# Patient Record
Sex: MALE | Race: WHITE | Employment: OTHER | URBAN - METROPOLITAN AREA
[De-identification: names, ages, dates, MRNs, and addresses within clinical notes are randomized per-mention and may not be internally consistent; named-entity substitution may affect disease eponyms.]

---

## 2022-06-04 ENCOUNTER — TELEPHONE ENCOUNTER (OUTPATIENT)
Dept: URBAN - METROPOLITAN AREA CLINIC 68 | Facility: CLINIC | Age: 77
End: 2022-06-04

## 2022-06-04 RX ORDER — POLYETHYLENE GLYCOL 3350, SODIUM SULFATE, SODIUM CHLORIDE, POTASSIUM CHLORIDE, ASCORBIC ACID, SODIUM ASCORBATE 7.5-2.691G
KIT ORAL AS DIRECTED
Qty: 1 | Refills: 0 | OUTPATIENT
Start: 2012-01-10 | End: 2017-02-02

## 2022-06-04 RX ORDER — LOSARTAN POTASSIUM AND HYDROCHLOROTHIAZIDE 100; 25 MG/1; MG/1
LOSARTAN POTASSIUM-HCTZ( 100-25MG ORAL  DAILY ) INACTIVE -HX ENTRY TABLET, FILM COATED ORAL DAILY
OUTPATIENT
Start: 2017-02-02

## 2022-06-04 RX ORDER — DOXAZOSIN 2 MG/1
DOXAZOSIN MESYLATE( 2MG ORAL  3 MG PER DAY  ) INACTIVE -HX ENTRY TABLET ORAL
OUTPATIENT
Start: 2017-02-02

## 2022-06-05 ENCOUNTER — TELEPHONE ENCOUNTER (OUTPATIENT)
Dept: URBAN - METROPOLITAN AREA CLINIC 68 | Facility: CLINIC | Age: 77
End: 2022-06-05

## 2022-06-05 RX ORDER — TESTOSTERONE CYPIONATE 100 MG/ML
TESTOSTERONE CYPIONATE( 100MG/ML INTRAMUSCULAR  EVERY 2 WEEKS ) ACTIVE -HX ENTRY INJECTION, SOLUTION INTRAMUSCULAR EVERY 2 WEEKS
Status: ACTIVE | COMMUNITY
Start: 2017-02-02

## 2022-06-05 RX ORDER — ASPIRIN 81 MG/1
ASPIRIN EC( 81MG ORAL  150 MG PER DAY  ) ACTIVE -HX ENTRY TABLET ORAL
Status: ACTIVE | COMMUNITY
Start: 2017-02-02

## 2022-06-05 RX ORDER — SIMVASTATIN 40 MG/1
SIMVASTATIN( 40MG ORAL  DAILY ) ACTIVE -HX ENTRY TABLET, FILM COATED ORAL DAILY
Status: ACTIVE | COMMUNITY
Start: 2017-02-02

## 2022-06-05 RX ORDER — LOSARTAN POTASSIUM 100 MG/1
LOSARTAN POTASSIUM( 100MG ORAL  DAILY ) ACTIVE -HX ENTRY TABLET ORAL DAILY
Status: ACTIVE | COMMUNITY
Start: 2017-02-02

## 2022-06-05 RX ORDER — ANASTROZOLE 1 MG/1
ANASTROZOLE( 1MG ORAL 1 A WEEK ) ACTIVE -HX ENTRY TABLET ORAL
Status: ACTIVE | COMMUNITY
Start: 2017-02-02

## 2022-06-25 ENCOUNTER — TELEPHONE ENCOUNTER (OUTPATIENT)
Age: 77
End: 2022-06-25

## 2022-06-25 RX ORDER — POLYETHYLENE GLYCOL 3350, SODIUM SULFATE, SODIUM CHLORIDE, POTASSIUM CHLORIDE, ASCORBIC ACID, SODIUM ASCORBATE 7.5-2.691G
KIT ORAL AS DIRECTED
Qty: 1 | Refills: 0 | OUTPATIENT
Start: 2012-01-10 | End: 2017-02-02

## 2022-06-25 RX ORDER — DOXAZOSIN 2 MG/1
DOXAZOSIN MESYLATE( 2MG ORAL  3 MG PER DAY  ) INACTIVE -HX ENTRY TABLET ORAL
OUTPATIENT
Start: 2017-02-02

## 2022-06-25 RX ORDER — LOSARTAN POTASSIUM AND HYDROCHLOROTHIAZIDE 25; 100 MG/1; MG/1
LOSARTAN POTASSIUM-HCTZ( 100-25MG ORAL  DAILY ) INACTIVE -HX ENTRY TABLET, FILM COATED ORAL DAILY
OUTPATIENT
Start: 2017-02-02

## 2022-06-26 ENCOUNTER — TELEPHONE ENCOUNTER (OUTPATIENT)
Age: 77
End: 2022-06-26

## 2022-06-26 RX ORDER — METFORMIN HYDROCHLORIDE 1000 MG/1
METFORMIN HCL( 500MG ORAL  TWO TIMES DAILY ) ACTIVE -HX ENTRY TABLET, FILM COATED, EXTENDED RELEASE ORAL
Status: ACTIVE | COMMUNITY
Start: 2017-02-02

## 2022-06-26 RX ORDER — ASPIRIN 81 MG
ASPIRIN EC( 81MG ORAL  150 MG PER DAY  ) ACTIVE -HX ENTRY TABLET, DELAYED RELEASE (ENTERIC COATED) ORAL
Status: ACTIVE | COMMUNITY
Start: 2017-02-02

## 2022-06-26 RX ORDER — LOSARTAN POTASSIUM 100 MG/1
LOSARTAN POTASSIUM( 100MG ORAL  DAILY ) ACTIVE -HX ENTRY TABLET, FILM COATED ORAL DAILY
Status: ACTIVE | COMMUNITY
Start: 2017-02-02

## 2022-06-26 RX ORDER — SODIUM SULFATE, POTASSIUM SULFATE, MAGNESIUM SULFATE 17.5; 3.13; 1.6 G/ML; G/ML; G/ML
SOLUTION, CONCENTRATE ORAL AS DIRECTED
Qty: 1 | Refills: 0 | Status: ACTIVE | COMMUNITY
Start: 2017-02-02

## 2022-06-26 RX ORDER — TESTOSTERONE CYPIONATE 100 MG/ML
TESTOSTERONE CYPIONATE( 100MG/ML INTRAMUSCULAR  EVERY 2 WEEKS ) ACTIVE -HX ENTRY INJECTION, SOLUTION INTRAMUSCULAR EVERY 2 WEEKS
Status: ACTIVE | COMMUNITY
Start: 2017-02-02

## 2022-06-26 RX ORDER — ANASTROZOLE 1 MG/1
ANASTROZOLE( 1MG ORAL 1 A WEEK ) ACTIVE -HX ENTRY TABLET ORAL
Status: ACTIVE | COMMUNITY
Start: 2017-02-02

## 2022-06-26 RX ORDER — PHENYLEPHRINE HCL 10 MG
VITAMIN B-12( 1000MCG/ML INJECTION  EVERY TWO WEEKS ) ACTIVE -HX ENTRY TABLET ORAL
Status: ACTIVE | COMMUNITY
Start: 2017-02-02

## 2022-06-26 RX ORDER — SIMVASTATIN 40 MG/1
SIMVASTATIN( 40MG ORAL  DAILY ) ACTIVE -HX ENTRY TABLET, FILM COATED ORAL DAILY
Status: ACTIVE | COMMUNITY
Start: 2017-02-02

## 2023-01-03 ENCOUNTER — DASHBOARD ENCOUNTERS (OUTPATIENT)
Age: 78
End: 2023-01-03

## 2023-01-03 ENCOUNTER — OFFICE VISIT (OUTPATIENT)
Dept: URBAN - METROPOLITAN AREA CLINIC 68 | Facility: CLINIC | Age: 78
End: 2023-01-03

## 2023-01-03 RX ORDER — ASPIRIN 81 MG/1
ASPIRIN EC( 81MG ORAL  150 MG PER DAY  ) ACTIVE -HX ENTRY TABLET ORAL
Status: ACTIVE | COMMUNITY
Start: 2017-02-02

## 2023-01-03 RX ORDER — SIMVASTATIN 40 MG/1
SIMVASTATIN( 40MG ORAL  DAILY ) ACTIVE -HX ENTRY TABLET, FILM COATED ORAL DAILY
Status: ACTIVE | COMMUNITY
Start: 2017-02-02

## 2023-01-03 RX ORDER — ANASTROZOLE 1 MG/1
ANASTROZOLE( 1MG ORAL 1 A WEEK ) ACTIVE -HX ENTRY TABLET ORAL
Status: DISCONTINUED | COMMUNITY
Start: 2017-02-02

## 2023-01-03 RX ORDER — TESTOSTERONE CYPIONATE 100 MG/ML
TESTOSTERONE CYPIONATE( 100MG/ML INTRAMUSCULAR  EVERY 2 WEEKS ) ACTIVE -HX ENTRY INJECTION, SOLUTION INTRAMUSCULAR EVERY 2 WEEKS
Status: DISCONTINUED | COMMUNITY
Start: 2017-02-02

## 2023-01-03 RX ORDER — LOSARTAN POTASSIUM 100 MG/1
LOSARTAN POTASSIUM( 100MG ORAL  DAILY ) ACTIVE -HX ENTRY TABLET ORAL DAILY
Status: ACTIVE | COMMUNITY
Start: 2017-02-02

## 2023-01-03 NOTE — HPI-MIGRATED HPI
General : Sees Dr Omalley, recommended colonoscopy after diarrhea episode one year ago, and recommended evaluation here, all resolved, pt takes activia NO blood in the stool  No alarm symptoms no change in BM pattern, no pain  No family history of colon cancer today exam heme negative stool  was seeing Dr Suarez who is now  doctor Last colonoscopy 2017 , no polyps